# Patient Record
Sex: MALE | Race: WHITE | NOT HISPANIC OR LATINO | Employment: FULL TIME | URBAN - METROPOLITAN AREA
[De-identification: names, ages, dates, MRNs, and addresses within clinical notes are randomized per-mention and may not be internally consistent; named-entity substitution may affect disease eponyms.]

---

## 2020-01-15 ENCOUNTER — HOSPITAL ENCOUNTER (OUTPATIENT)
Facility: MEDICAL CENTER | Age: 60
End: 2020-01-15
Attending: PREVENTIVE MEDICINE
Payer: COMMERCIAL

## 2020-01-15 ENCOUNTER — EH NON-PROVIDER (OUTPATIENT)
Dept: OCCUPATIONAL MEDICINE | Facility: CLINIC | Age: 60
End: 2020-01-15
Payer: OTHER GOVERNMENT

## 2020-01-15 ENCOUNTER — APPOINTMENT (OUTPATIENT)
Dept: RADIOLOGY | Facility: IMAGING CENTER | Age: 60
End: 2020-01-15
Attending: PREVENTIVE MEDICINE
Payer: OTHER GOVERNMENT

## 2020-01-15 DIAGNOSIS — Z02.1 PRE-EMPLOYMENT DRUG SCREENING: ICD-10-CM

## 2020-01-15 DIAGNOSIS — Z02.89 ENCOUNTER FOR OCCUPATIONAL HEALTH EXAMINATION: Primary | ICD-10-CM

## 2020-01-15 DIAGNOSIS — Z02.89 ENCOUNTER FOR OCCUPATIONAL HEALTH EXAMINATION: ICD-10-CM

## 2020-01-15 LAB
ALBUMIN SERPL BCP-MCNC: 4.6 G/DL (ref 3.2–4.9)
ALBUMIN/GLOB SERPL: 1.6 G/DL
ALP SERPL-CCNC: 75 U/L (ref 30–99)
ALT SERPL-CCNC: 28 U/L (ref 2–50)
AMP AMPHETAMINE: NORMAL
ANION GAP SERPL CALC-SCNC: 9 MMOL/L (ref 0–11.9)
AST SERPL-CCNC: 20 U/L (ref 12–45)
BAR BARBITURATES: NORMAL
BASOPHILS # BLD AUTO: 0.3 % (ref 0–1.8)
BASOPHILS # BLD: 0.02 K/UL (ref 0–0.12)
BILIRUB CONJ SERPL-MCNC: 0.1 MG/DL (ref 0.1–0.5)
BILIRUB INDIRECT SERPL-MCNC: 0.5 MG/DL (ref 0–1)
BILIRUB SERPL-MCNC: 0.6 MG/DL (ref 0.1–1.5)
BUN SERPL-MCNC: 19 MG/DL (ref 8–22)
BZO BENZODIAZEPINES: NORMAL
CALCIUM SERPL-MCNC: 9.7 MG/DL (ref 8.5–10.5)
CHLORIDE SERPL-SCNC: 103 MMOL/L (ref 96–112)
CO2 SERPL-SCNC: 26 MMOL/L (ref 20–33)
COC COCAINE: NORMAL
CREAT SERPL-MCNC: 1 MG/DL (ref 0.5–1.4)
EOSINOPHIL # BLD AUTO: 0.12 K/UL (ref 0–0.51)
EOSINOPHIL NFR BLD: 2 % (ref 0–6.9)
ERYTHROCYTE [DISTWIDTH] IN BLOOD BY AUTOMATED COUNT: 44.2 FL (ref 35.9–50)
GLOBULIN SER CALC-MCNC: 2.9 G/DL (ref 1.9–3.5)
GLUCOSE SERPL-MCNC: 96 MG/DL (ref 65–99)
HCT VFR BLD AUTO: 44.6 % (ref 42–52)
HGB BLD-MCNC: 14.7 G/DL (ref 14–18)
IMM GRANULOCYTES # BLD AUTO: 0.02 K/UL (ref 0–0.11)
IMM GRANULOCYTES NFR BLD AUTO: 0.3 % (ref 0–0.9)
INT CON NEG: NORMAL
INT CON POS: NORMAL
LYMPHOCYTES # BLD AUTO: 1.32 K/UL (ref 1–4.8)
LYMPHOCYTES NFR BLD: 21.6 % (ref 22–41)
MCH RBC QN AUTO: 30.9 PG (ref 27–33)
MCHC RBC AUTO-ENTMCNC: 33 G/DL (ref 33.7–35.3)
MCV RBC AUTO: 93.9 FL (ref 81.4–97.8)
MDMA ECSTASY: NORMAL
MET METHAMPHETAMINES: NORMAL
MONOCYTES # BLD AUTO: 0.54 K/UL (ref 0–0.85)
MONOCYTES NFR BLD AUTO: 8.9 % (ref 0–13.4)
MTD METHADONE: NORMAL
NEUTROPHILS # BLD AUTO: 4.08 K/UL (ref 1.82–7.42)
NEUTROPHILS NFR BLD: 66.9 % (ref 44–72)
NRBC # BLD AUTO: 0 K/UL
NRBC BLD-RTO: 0 /100 WBC
OPI OPIATES: NORMAL
OXY OXYCODONE: NORMAL
PCP PHENCYCLIDINE: NORMAL
PLATELET # BLD AUTO: 229 K/UL (ref 164–446)
PMV BLD AUTO: 9.4 FL (ref 9–12.9)
POC URINE DRUG SCREEN OCDRS: NORMAL
POTASSIUM SERPL-SCNC: 3.8 MMOL/L (ref 3.6–5.5)
PROT SERPL-MCNC: 7.5 G/DL (ref 6–8.2)
RBC # BLD AUTO: 4.75 M/UL (ref 4.7–6.1)
SODIUM SERPL-SCNC: 138 MMOL/L (ref 135–145)
THC: NORMAL
WBC # BLD AUTO: 6.1 K/UL (ref 4.8–10.8)

## 2020-01-15 PROCEDURE — 86480 TB TEST CELL IMMUN MEASURE: CPT | Performed by: PREVENTIVE MEDICINE

## 2020-01-15 PROCEDURE — 92552 PURE TONE AUDIOMETRY AIR: CPT | Performed by: PREVENTIVE MEDICINE

## 2020-01-15 PROCEDURE — 36415 COLL VENOUS BLD VENIPUNCTURE: CPT | Performed by: PREVENTIVE MEDICINE

## 2020-01-15 PROCEDURE — 94375 RESPIRATORY FLOW VOLUME LOOP: CPT | Performed by: PREVENTIVE MEDICINE

## 2020-01-15 PROCEDURE — 94010 BREATHING CAPACITY TEST: CPT | Performed by: PREVENTIVE MEDICINE

## 2020-01-15 PROCEDURE — 86762 RUBELLA ANTIBODY: CPT | Performed by: PREVENTIVE MEDICINE

## 2020-01-15 PROCEDURE — 90715 TDAP VACCINE 7 YRS/> IM: CPT | Performed by: PREVENTIVE MEDICINE

## 2020-01-15 PROCEDURE — 86735 MUMPS ANTIBODY: CPT | Performed by: PREVENTIVE MEDICINE

## 2020-01-15 PROCEDURE — 71045 X-RAY EXAM CHEST 1 VIEW: CPT | Mod: TC | Performed by: NURSE PRACTITIONER

## 2020-01-15 PROCEDURE — 86765 RUBEOLA ANTIBODY: CPT | Performed by: PREVENTIVE MEDICINE

## 2020-01-15 PROCEDURE — 80305 DRUG TEST PRSMV DIR OPT OBS: CPT | Performed by: PREVENTIVE MEDICINE

## 2020-01-17 LAB
GAMMA INTERFERON BACKGROUND BLD IA-ACNC: 0.02 IU/ML
M TB IFN-G BLD-IMP: NEGATIVE
M TB IFN-G CD4+ BCKGRND COR BLD-ACNC: 0 IU/ML
MEV IGG SER IA-ACNC: 0.14
MITOGEN IGNF BCKGRD COR BLD-ACNC: >10 IU/ML
MUV IGG SER IA-ACNC: 0.75
QFT TB2 - NIL TBQ2: 0 IU/ML
RUBV AB SER QL: 47.7 IU/ML
VZV IGG SER IA-ACNC: 2.74

## 2020-01-22 ENCOUNTER — EMPLOYEE HEALTH (OUTPATIENT)
Dept: OCCUPATIONAL MEDICINE | Facility: CLINIC | Age: 60
End: 2020-01-22
Payer: OTHER GOVERNMENT

## 2020-01-22 DIAGNOSIS — Z23 NEED FOR VACCINATION: ICD-10-CM

## 2020-01-22 PROCEDURE — 8915 PR COMPREHENSIVE PHYSICAL: Performed by: PREVENTIVE MEDICINE

## 2020-01-22 PROCEDURE — 90707 MMR VACCINE SC: CPT | Performed by: PREVENTIVE MEDICINE

## 2020-01-22 SDOH — HEALTH STABILITY: MENTAL HEALTH: HOW OFTEN DO YOU HAVE A DRINK CONTAINING ALCOHOL?: MONTHLY OR LESS

## 2020-01-22 SDOH — HEALTH STABILITY: MENTAL HEALTH: HOW MANY STANDARD DRINKS CONTAINING ALCOHOL DO YOU HAVE ON A TYPICAL DAY?: 1 OR 2

## 2020-01-22 SDOH — HEALTH STABILITY: MENTAL HEALTH: HOW OFTEN DO YOU HAVE 6 OR MORE DRINKS ON ONE OCCASION?: NEVER

## 2020-02-22 ENCOUNTER — HOSPITAL ENCOUNTER (EMERGENCY)
Facility: MEDICAL CENTER | Age: 60
End: 2020-02-22
Attending: EMERGENCY MEDICINE
Payer: OTHER GOVERNMENT

## 2020-02-22 VITALS
TEMPERATURE: 97.5 F | HEIGHT: 73 IN | WEIGHT: 215 LBS | BODY MASS INDEX: 28.49 KG/M2 | SYSTOLIC BLOOD PRESSURE: 126 MMHG | RESPIRATION RATE: 28 BRPM | HEART RATE: 75 BPM | OXYGEN SATURATION: 95 % | DIASTOLIC BLOOD PRESSURE: 73 MMHG

## 2020-02-22 DIAGNOSIS — S01.81XA FACIAL LACERATION, INITIAL ENCOUNTER: ICD-10-CM

## 2020-02-22 DIAGNOSIS — R55 VASOVAGAL SYNCOPE: ICD-10-CM

## 2020-02-22 LAB
ANION GAP SERPL CALC-SCNC: 10 MMOL/L (ref 0–11.9)
BASOPHILS # BLD AUTO: 0.4 % (ref 0–1.8)
BASOPHILS # BLD: 0.03 K/UL (ref 0–0.12)
BUN SERPL-MCNC: 24 MG/DL (ref 8–22)
CALCIUM SERPL-MCNC: 8 MG/DL (ref 8.5–10.5)
CHLORIDE SERPL-SCNC: 108 MMOL/L (ref 96–112)
CO2 SERPL-SCNC: 21 MMOL/L (ref 20–33)
CREAT SERPL-MCNC: 1.13 MG/DL (ref 0.5–1.4)
EKG IMPRESSION: NORMAL
EKG IMPRESSION: NORMAL
EOSINOPHIL # BLD AUTO: 0.02 K/UL (ref 0–0.51)
EOSINOPHIL NFR BLD: 0.3 % (ref 0–6.9)
ERYTHROCYTE [DISTWIDTH] IN BLOOD BY AUTOMATED COUNT: 44.9 FL (ref 35.9–50)
GLUCOSE SERPL-MCNC: 141 MG/DL (ref 65–99)
HCT VFR BLD AUTO: 44.7 % (ref 42–52)
HGB BLD-MCNC: 14.2 G/DL (ref 14–18)
IMM GRANULOCYTES # BLD AUTO: 0.02 K/UL (ref 0–0.11)
IMM GRANULOCYTES NFR BLD AUTO: 0.3 % (ref 0–0.9)
LYMPHOCYTES # BLD AUTO: 0.52 K/UL (ref 1–4.8)
LYMPHOCYTES NFR BLD: 6.8 % (ref 22–41)
MCH RBC QN AUTO: 30.2 PG (ref 27–33)
MCHC RBC AUTO-ENTMCNC: 31.8 G/DL (ref 33.7–35.3)
MCV RBC AUTO: 95.1 FL (ref 81.4–97.8)
MONOCYTES # BLD AUTO: 0.5 K/UL (ref 0–0.85)
MONOCYTES NFR BLD AUTO: 6.5 % (ref 0–13.4)
NEUTROPHILS # BLD AUTO: 6.59 K/UL (ref 1.82–7.42)
NEUTROPHILS NFR BLD: 85.7 % (ref 44–72)
NRBC # BLD AUTO: 0 K/UL
NRBC BLD-RTO: 0 /100 WBC
PLATELET # BLD AUTO: 176 K/UL (ref 164–446)
PMV BLD AUTO: 9.3 FL (ref 9–12.9)
POTASSIUM SERPL-SCNC: 3.4 MMOL/L (ref 3.6–5.5)
RBC # BLD AUTO: 4.7 M/UL (ref 4.7–6.1)
SODIUM SERPL-SCNC: 139 MMOL/L (ref 135–145)
TROPONIN T SERPL-MCNC: 8 NG/L (ref 6–19)
WBC # BLD AUTO: 7.7 K/UL (ref 4.8–10.8)

## 2020-02-22 PROCEDURE — 93005 ELECTROCARDIOGRAM TRACING: CPT | Performed by: EMERGENCY MEDICINE

## 2020-02-22 PROCEDURE — 99284 EMERGENCY DEPT VISIT MOD MDM: CPT

## 2020-02-22 PROCEDURE — 84484 ASSAY OF TROPONIN QUANT: CPT

## 2020-02-22 PROCEDURE — 93005 ELECTROCARDIOGRAM TRACING: CPT

## 2020-02-22 PROCEDURE — 80048 BASIC METABOLIC PNL TOTAL CA: CPT

## 2020-02-22 PROCEDURE — 85025 COMPLETE CBC W/AUTO DIFF WBC: CPT

## 2020-02-22 ASSESSMENT — ENCOUNTER SYMPTOMS
PALPITATIONS: 0
LOSS OF CONSCIOUSNESS: 1
FEVER: 0
DIZZINESS: 1
SORE THROAT: 0
DIARRHEA: 0

## 2020-02-22 NOTE — ED TRIAGE NOTES
Brought in by medics from a [employer] testing center. Patient recently started working here and was taking a test for his job certification. Sitting in the chair he felt faint, had a syncopal episode and slid out of chair. Small laceration to left eyebrow. Denies any blood thinners. States he did not eat breakfast and normally does, felt as if he was getting a cold this morning. Patient endorses feeling well at this time. Has had vasovagal episodes in the past.

## 2020-02-22 NOTE — ED NOTES
Discharged to home with spouse. Verbalized understanding of all discharge instructions, education, and follow-up care. Denied concerns, needs upon discharge. Ambulated out of department independently without difficulty.

## 2020-02-22 NOTE — ED PROVIDER NOTES
ED Provider Note    Scribed for Ananda Raya M.D. by Geraldine Alves. 2/22/2020, 1:46 PM.    Primary care provider: Pcp Pt States None  Means of arrival: EMS  History obtained from: Patient  History limited by: None    CHIEF COMPLAINT  Chief Complaint   Patient presents with   • Syncope   • T-5000 GLF       ALDO Marcos is a 59 y.o. male who presents to the Emergency Department for syncopal episode onset prior to arrival. The patient was at work today sitting at a desk when he felt lightheaded and lost consciousness shortly after. He struck his forehead on the desk. The patient endorsed fatigue this morning and felt he may be getting sick. The patient denies chest pain, palpitations, cough, nasal congestion, sore throat, fever, or diarrhea. The patient has a history vasovagal syncope onset 8 years ago.       REVIEW OF SYSTEMS  Review of Systems   Constitutional: Positive for malaise/fatigue. Negative for fever.   HENT: Negative for congestion and sore throat.    Cardiovascular: Negative for chest pain and palpitations.   Gastrointestinal: Negative for diarrhea.   Neurological: Positive for dizziness and loss of consciousness.        Positive for syncope   All other systems reviewed and are negative.      PAST MEDICAL HISTORY       SURGICAL HISTORY  patient denies any surgical history    SOCIAL HISTORY  Social History     Tobacco Use   • Smoking status: Never Smoker   • Smokeless tobacco: Never Used   Substance Use Topics   • Alcohol use: Yes     Frequency: Monthly or less     Drinks per session: 1 or 2     Binge frequency: Never   • Drug use: Never      Social History     Substance and Sexual Activity   Drug Use Never       FAMILY HISTORY  History reviewed. No pertinent family history.    CURRENT MEDICATIONS  Home Medications     Reviewed by Yanique Jorge R.N. (Registered Nurse) on 02/22/20 at 1325  Med List Status: Partial   Medication Last Dose Status        Patient Laz Taking any Medications            "            ALLERGIES  No Known Allergies    PHYSICAL EXAM  VITAL SIGNS: Pulse 72   Temp 36.4 °C (97.5 °F) (Temporal)   Resp 18   Ht 1.854 m (6' 1\")   Wt 97.5 kg (215 lb)   SpO2 92%   BMI 28.37 kg/m²     Constitutional:  No acute distress  HENT:  Moist mucous membranes. 0.5 cm laceration of the left supraorbital rim   Eyes: No conjunctivitis or icterus  Neck:  trachea is midline, no palpable thyroid  Lymphatic:  No cervical lymphadenopathy  Cardiovascular:  Regular rate and rhythm, no murmurs  Thorax & Lungs:  Normal breath sounds, no rhonchi  Abdomen:  Soft, Non-tender  Skin:.  no rash  Back:  Non-tender, no CVA tenderness  Extremities:   no edema  Vascular:  symmetric radial pulse  Neurologic: Alert and oriented. Normal gross motor      LABS  Labs Reviewed   CBC WITH DIFFERENTIAL - Abnormal; Notable for the following components:       Result Value    MCHC 31.8 (*)     Neutrophils-Polys 85.70 (*)     Lymphocytes 6.80 (*)     Lymphs (Absolute) 0.52 (*)     All other components within normal limits   BASIC METABOLIC PANEL - Abnormal; Notable for the following components:    Potassium 3.4 (*)     Glucose 141 (*)     Bun 24 (*)     Calcium 8.0 (*)     All other components within normal limits   TROPONIN   ESTIMATED GFR     All labs reviewed by me.    EKG Interpretation    EKG Interpretation  Interpreted by me  Normal Sinus Rhythm. Rate 73  Normal QTc  Normal QRS  Normal Axis    COURSE & MEDICAL DECISION MAKING  Pertinent Labs & Imaging studies reviewed. (See chart for details)    1:46 PM - Patient seen and examined at bedside. Ordered Estimated GFR, CBC, BMP, Troponin, and EKG to evaluate his symptoms. The differential diagnoses include but are not limited to: Vasovagal syncope assess electrolyte, blood sugar, and cardiac function    4:48 PM - Re-examined; The patient is resting in bed. I discussed his above findings were overall unremarkable and plans for discharge. He was given a referral to [facility] and instructed to return to the ED if his symptoms worsen. Patient understands and agrees.       Medical Decision Making:   Patient presents with symptoms of a vasovagal episode.  He has had a before he did not feel well when he woke up today.  His lab work shows no significant abnormality nondetectable troponin normal EKG.  At this point I think further evaluation is needed he is discharged with return precautions for vasovagal episode and given referral follow-up     The patient will return for new or worsening symptoms and is stable at the time of discharge.    The patient is referred to a primary physician for blood pressure management, diabetic screening, and for all other preventative health concerns.      DISPOSITION:  Patient will be discharged home in stable condition.    FOLLOW UP:  [address] [address]  [phone]            FINAL IMPRESSION  1. Facial laceration, initial encounter    2. Vasovagal syncope          Geraldine MANCERA (Scribe), am scribing for, and in the presence of, Ananda Raya M.D..    Electronically signed by: Geraldine Alves (Scribe), 2/22/2020    IAnanda M.D. personally performed the services described in this documentation, as scribed by Geraldine Alves in my presence, and it is both accurate and complete.  C  The note accurately reflects work and decisions made by me.  Ananda Raya M.D.  2/22/2020  6:46 PM

## 2020-02-22 NOTE — ED NOTES
Resting in bed. Call bell within reach. Patient denies any symptoms. Repositioning self in bed. Spouse at bedside.

## 2020-02-27 ENCOUNTER — EH NON-PROVIDER (OUTPATIENT)
Dept: OCCUPATIONAL MEDICINE | Facility: CLINIC | Age: 60
End: 2020-02-27

## 2020-02-27 DIAGNOSIS — Z23 NEED FOR VACCINATION: Primary | ICD-10-CM

## 2020-02-27 PROCEDURE — 90707 MMR VACCINE SC: CPT | Performed by: NURSE PRACTITIONER

## 2020-09-22 ENCOUNTER — IMMUNIZATION (OUTPATIENT)
Dept: OCCUPATIONAL MEDICINE | Facility: CLINIC | Age: 60
End: 2020-09-22

## 2020-09-22 DIAGNOSIS — Z23 NEED FOR VACCINATION: ICD-10-CM

## 2020-09-22 PROCEDURE — 90686 IIV4 VACC NO PRSV 0.5 ML IM: CPT | Performed by: PREVENTIVE MEDICINE

## 2020-11-27 ENCOUNTER — HOSPITAL ENCOUNTER (OUTPATIENT)
Facility: MEDICAL CENTER | Age: 60
End: 2020-11-27
Attending: NURSE PRACTITIONER
Payer: COMMERCIAL

## 2020-11-27 ENCOUNTER — OFFICE VISIT (OUTPATIENT)
Dept: URGENT CARE | Facility: CLINIC | Age: 60
End: 2020-11-27
Payer: COMMERCIAL

## 2020-11-27 VITALS
SYSTOLIC BLOOD PRESSURE: 116 MMHG | OXYGEN SATURATION: 97 % | TEMPERATURE: 98.5 F | DIASTOLIC BLOOD PRESSURE: 72 MMHG | BODY MASS INDEX: 26.9 KG/M2 | HEIGHT: 73 IN | RESPIRATION RATE: 16 BRPM | HEART RATE: 82 BPM | WEIGHT: 203 LBS

## 2020-11-27 DIAGNOSIS — Z20.822 SUSPECTED COVID-19 VIRUS INFECTION: ICD-10-CM

## 2020-11-27 DIAGNOSIS — R05.9 COUGH: ICD-10-CM

## 2020-11-27 PROCEDURE — U0003 INFECTIOUS AGENT DETECTION BY NUCLEIC ACID (DNA OR RNA); SEVERE ACUTE RESPIRATORY SYNDROME CORONAVIRUS 2 (SARS-COV-2) (CORONAVIRUS DISEASE [COVID-19]), AMPLIFIED PROBE TECHNIQUE, MAKING USE OF HIGH THROUGHPUT TECHNOLOGIES AS DESCRIBED BY CMS-2020-01-R: HCPCS

## 2020-11-27 PROCEDURE — 99203 OFFICE O/P NEW LOW 30 MIN: CPT | Mod: CS | Performed by: NURSE PRACTITIONER

## 2020-11-27 RX ORDER — CODEINE PHOSPHATE AND GUAIFENESIN 10; 100 MG/5ML; MG/5ML
5 SOLUTION ORAL EVERY 4 HOURS PRN
Qty: 100 ML | Refills: 0 | Status: SHIPPED | OUTPATIENT
Start: 2020-11-27 | End: 2020-12-02

## 2020-11-27 RX ORDER — TAMSULOSIN HYDROCHLORIDE 0.4 MG/1
CAPSULE ORAL
COMMUNITY
Start: 2020-11-18

## 2020-11-27 RX ORDER — ALBUTEROL SULFATE 90 UG/1
2 AEROSOL, METERED RESPIRATORY (INHALATION) EVERY 4 HOURS PRN
Qty: 1 EACH | Refills: 0 | Status: SHIPPED | OUTPATIENT
Start: 2020-11-27 | End: 2020-12-11

## 2020-11-27 RX ORDER — OMEPRAZOLE 20 MG/1
20 CAPSULE, DELAYED RELEASE ORAL DAILY
COMMUNITY
End: 2022-07-03

## 2020-11-27 ASSESSMENT — ENCOUNTER SYMPTOMS
MYALGIAS: 1
SINUS PAIN: 0
CHILLS: 0
WHEEZING: 0
FEVER: 0
EYE DISCHARGE: 0
NAUSEA: 0
DIARRHEA: 0
HEADACHES: 1
ABDOMINAL PAIN: 0
RHINORRHEA: 1
SORE THROAT: 0
SHORTNESS OF BREATH: 0
VOMITING: 0
PALPITATIONS: 0
COUGH: 1
SPUTUM PRODUCTION: 0
EYE PAIN: 0
EYE REDNESS: 0
HEMOPTYSIS: 0

## 2020-11-27 ASSESSMENT — FIBROSIS 4 INDEX: FIB4 SCORE: 1.29

## 2020-11-27 NOTE — PROGRESS NOTES
Subjective:     Abbi Marcos is a 60 y.o. 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 The current episode started in the past 7 days. The problem has been unchanged. The cough is non-productive. Associated symptoms include headaches, myalgias, nasal congestion and rhinorrhea. Pertinent negatives include no chest pain, chills, ear pain, eye redness, fever, hemoptysis, rash, sore throat, shortness of breath or wheezing. He has tried nothing for the symptoms. There is no history of environmental allergies.   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      Review of Systems   Constitutional: Positive for malaise/fatigue. Negative for chills and fever.   HENT: Positive for congestion and rhinorrhea. Negative for ear pain, sinus pain and sore throat.    Eyes: Negative for pain, discharge and redness.   Respiratory: Positive for cough. Negative for hemoptysis, sputum production, shortness of breath and wheezing.    Cardiovascular: Negative for chest pain and palpitations.   Gastrointestinal: Negative for abdominal pain, diarrhea, nausea and vomiting.   Musculoskeletal: Positive for myalgias.   Skin: Negative for rash.   Neurological: Positive for headaches.   Endo/Heme/Allergies: Negative for environmental allergies.   All other systems reviewed and are negative.      PMH: No past medical history on file.  ALLERGIES: No Known Allergies  SURGHX: No past surgical history on file.  SOCHX:   Social History     Socioeconomic History   • Marital status:      Spouse name: Not on file   • Number of children: Not on file   • Years of education: Not on file   • Highest education level: Not on file   Occupational History   • Not on  "file   Social Needs   • Financial resource strain: Not on file   • Food insecurity     Worry: Not on file     Inability: Not on file   • Transportation needs     Medical: Not on file     Non-medical: Not on file   Tobacco Use   • Smoking status: Never Smoker   • Smokeless tobacco: Never Used   Substance and Sexual Activity   • Alcohol use: Yes     Frequency: Monthly or less     Drinks per session: 1 or 2     Binge frequency: Never   • Drug use: Never   • Sexual activity: Not on file   Lifestyle   • Physical activity     Days per week: Not on file     Minutes per session: Not on file   • Stress: Not on file   Relationships   • Social connections     Talks on phone: Not on file     Gets together: Not on file     Attends Ten[employer] service: Not on file     Active member of club or organization: Not on file     Attends meetings of clubs or organizations: Not on file     Relationship status: Not on file   • Intimate partner violence     Fear of current or ex partner: Not on file     Emotionally abused: Not on file     Physically abused: Not on file     Forced sexual activity: Not on file   Other Topics Concern   • Not on file   Social History Narrative   • Not on file     FH: No family history on file.      Objective:   /72   Pulse 82   Temp 36.9 °C (98.5 °F)   Resp 16   Ht 1.854 m (6' 1\")   Wt 92.1 kg (203 lb)   SpO2 97%   BMI 26.78 kg/m²     Physical Exam  Vitals signs and nursing note reviewed.   Constitutional:       General: He is not in acute distress.     Appearance: Normal appearance. He is ill-appearing. He is not toxic-appearing.   HENT:      Head: Normocephalic and atraumatic.      Right Ear: Tympanic membrane, ear canal and external ear normal. There is no impacted cerumen.      Left Ear: Tympanic membrane, ear canal and external ear normal. There is no impacted cerumen.      Nose: No congestion or rhinorrhea.      Mouth/Throat:      Mouth: Mucous membranes are moist.      Pharynx: No " oropharyngeal exudate or posterior oropharyngeal erythema.   Eyes:      General:         Right eye: No discharge.         Left eye: No discharge.      Extraocular Movements: Extraocular movements intact.      Pupils: Pupils are equal, round, and reactive to light.   Neck:      Musculoskeletal: Normal range of motion and neck supple. Muscular tenderness present.   Cardiovascular:      Rate and Rhythm: Normal rate and regular rhythm.      Pulses: Normal pulses.      Heart sounds: Normal heart sounds. No murmur.   Pulmonary:      Effort: Pulmonary effort is normal. No respiratory distress.      Breath sounds: Normal breath sounds. No wheezing or rales.      Comments: Fine crackles in bilateral lower lobes  Abdominal:      General: Abdomen is flat. Bowel sounds are normal.      Palpations: Abdomen is soft.      Tenderness: There is abdominal tenderness. There is no right CVA tenderness or left CVA tenderness.   Musculoskeletal: Normal range of motion.   Lymphadenopathy:      Cervical: Cervical adenopathy present.   Skin:     General: Skin is warm and dry.      Capillary Refill: Capillary refill takes less than 2 seconds.   Neurological:      General: No focal deficit present.      Mental Status: He is alert and oriented to person, place, and time. Mental status is at baseline.   Psychiatric:         Mood and Affect: Mood normal.         Behavior: Behavior normal.         Thought Content: Thought content normal.         Judgment: Judgment normal.         Assessment/Plan:   Assessment    1. Suspected COVID-19 virus infection  COVID/SARS COV-2 PCR   2. Cough  guaifenesin-codeine (ROBITUSSIN AC) Solution oral solution    albuterol 108 (90 Base) MCG/ACT Aero Soln inhalation aerosol   Pt was tested for COVID today. I will call with results. Upon entering the room PPE was worn throughout the duration of his visit for both provider and patient. Mask of patient briefly removed for examination of oropharynx. PT was educated to  remain in self isolation for at least 10 days following the onset of symptoms and to not return to work until symptoms have resolved for three days without the use of symptom altering medication.     We discussed supportive measures including humidifier, warm salt water gargles, over-the-counter Cepacol throat lozenges, rest  and increased fluids. Pt was encouraged to seek treatment back in the ER or urgent care for worsening symptoms,  fever greater than 100.5, wheezes or shortness of breath.   AVS handout given and reviewed with patient. Pt educated on red flags and when to seek treatment back in ER or UC.

## 2020-11-29 DIAGNOSIS — Z20.822 SUSPECTED COVID-19 VIRUS INFECTION: ICD-10-CM

## 2020-11-29 LAB
COVID ORDER STATUS COVID19: NORMAL
SARS-COV-2 RNA RESP QL NAA+PROBE: DETECTED
SPECIMEN SOURCE: ABNORMAL

## 2020-11-30 ENCOUNTER — TELEPHONE (OUTPATIENT)
Dept: URGENT CARE | Facility: CLINIC | Age: 60
End: 2020-11-30

## 2020-11-30 DIAGNOSIS — R05.9 COUGH: ICD-10-CM

## 2020-11-30 DIAGNOSIS — U07.1 COVID-19 VIRUS DETECTED: ICD-10-CM

## 2020-11-30 RX ORDER — DEXTROMETHORPHAN HYDROBROMIDE AND PROMETHAZINE HYDROCHLORIDE 15; 6.25 MG/5ML; MG/5ML
5 SYRUP ORAL EVERY 4 HOURS PRN
Qty: 100 ML | Refills: 0 | Status: SHIPPED | OUTPATIENT
Start: 2020-11-30 | End: 2020-12-07

## 2020-12-20 DIAGNOSIS — Z23 NEED FOR VACCINATION: ICD-10-CM

## 2020-12-21 ENCOUNTER — APPOINTMENT (OUTPATIENT)
Dept: FAMILY PLANNING/WOMEN'S HEALTH CLINIC | Facility: IMMUNIZATION CENTER | Age: 60
End: 2020-12-21
Attending: FAMILY MEDICINE
Payer: COMMERCIAL

## 2020-12-21 DIAGNOSIS — Z23 NEED FOR VACCINATION: ICD-10-CM

## 2020-12-21 PROCEDURE — 91300 PFIZER SARS-COV-2 VACCINE: CPT

## 2020-12-21 PROCEDURE — 0001A PFIZER SARS-COV-2 VACCINE: CPT

## 2020-12-22 ENCOUNTER — IMMUNIZATION (OUTPATIENT)
Dept: FAMILY PLANNING/WOMEN'S HEALTH CLINIC | Facility: IMMUNIZATION CENTER | Age: 60
End: 2020-12-22
Payer: COMMERCIAL

## 2020-12-22 DIAGNOSIS — Z23 ENCOUNTER FOR VACCINATION: Primary | ICD-10-CM

## 2021-01-11 ENCOUNTER — IMMUNIZATION (OUTPATIENT)
Dept: FAMILY PLANNING/WOMEN'S HEALTH CLINIC | Facility: IMMUNIZATION CENTER | Age: 61
End: 2021-01-11
Attending: FAMILY MEDICINE
Payer: COMMERCIAL

## 2021-01-11 DIAGNOSIS — Z23 ENCOUNTER FOR VACCINATION: Primary | ICD-10-CM

## 2021-01-11 PROCEDURE — 0002A PFIZER SARS-COV-2 VACCINE: CPT

## 2021-01-11 PROCEDURE — 91300 PFIZER SARS-COV-2 VACCINE: CPT

## 2021-03-22 ENCOUNTER — EH NON-PROVIDER (OUTPATIENT)
Dept: OCCUPATIONAL MEDICINE | Facility: CLINIC | Age: 61
End: 2021-03-22

## 2021-03-22 DIAGNOSIS — Z02.89 ENCOUNTER FOR OCCUPATIONAL HEALTH EXAMINATION INVOLVING RESPIRATOR: ICD-10-CM

## 2021-03-22 PROCEDURE — 94375 RESPIRATORY FLOW VOLUME LOOP: CPT | Performed by: NURSE PRACTITIONER

## 2022-07-03 ENCOUNTER — OFFICE VISIT (OUTPATIENT)
Dept: URGENT CARE | Facility: CLINIC | Age: 62
End: 2022-07-03
Payer: COMMERCIAL

## 2022-07-03 ENCOUNTER — HOSPITAL ENCOUNTER (OUTPATIENT)
Facility: MEDICAL CENTER | Age: 62
End: 2022-07-03
Attending: FAMILY MEDICINE
Payer: COMMERCIAL

## 2022-07-03 VITALS
BODY MASS INDEX: 27.57 KG/M2 | RESPIRATION RATE: 16 BRPM | HEIGHT: 73 IN | WEIGHT: 208 LBS | OXYGEN SATURATION: 97 % | HEART RATE: 96 BPM | SYSTOLIC BLOOD PRESSURE: 108 MMHG | TEMPERATURE: 100.3 F | DIASTOLIC BLOOD PRESSURE: 70 MMHG

## 2022-07-03 DIAGNOSIS — Z03.818 ENCOUNTER FOR PATIENT CONCERN ABOUT EXPOSURE TO INFECTIOUS ORGANISM: ICD-10-CM

## 2022-07-03 LAB
EXTERNAL QUALITY CONTROL: NORMAL
SARS-COV+SARS-COV-2 AG RESP QL IA.RAPID: NEGATIVE

## 2022-07-03 PROCEDURE — U0003 INFECTIOUS AGENT DETECTION BY NUCLEIC ACID (DNA OR RNA); SEVERE ACUTE RESPIRATORY SYNDROME CORONAVIRUS 2 (SARS-COV-2) (CORONAVIRUS DISEASE [COVID-19]), AMPLIFIED PROBE TECHNIQUE, MAKING USE OF HIGH THROUGHPUT TECHNOLOGIES AS DESCRIBED BY CMS-2020-01-R: HCPCS

## 2022-07-03 PROCEDURE — 87426 SARSCOV CORONAVIRUS AG IA: CPT | Performed by: FAMILY MEDICINE

## 2022-07-03 PROCEDURE — U0005 INFEC AGEN DETEC AMPLI PROBE: HCPCS

## 2022-07-03 PROCEDURE — 99213 OFFICE O/P EST LOW 20 MIN: CPT | Mod: CS | Performed by: FAMILY MEDICINE

## 2022-07-03 NOTE — PROGRESS NOTES
"  Subjective:      61 y.o. male presents to urgent care for cold symptoms that started yesterday. He is experiencing sore throat, cough, body aches, and fever. No headache or diarrhea. He has been using Tylenol and cough drops with moderate relief in symptoms. He denies any tobacco product use. No history of asthma or COPD. He is fully vaccinated against COVID. No known sick contacts.     He denies any other questions or concerns at this time.    Current problem list, medication, and past medical/surgical history were reviewed in Epic.    ROS  See HPI     Objective:      /70 (BP Location: Left arm, Patient Position: Sitting, BP Cuff Size: Adult)   Pulse 96   Temp 37.9 °C (100.3 °F) (Temporal)   Resp 16   Ht 1.854 m (6' 1\")   Wt 94.3 kg (208 lb)   SpO2 97%   BMI 27.44 kg/m²     Physical Exam  Constitutional:       General: He is not in acute distress.     Appearance: He is not diaphoretic.   HENT:      Right Ear: Tympanic membrane, ear canal and external ear normal.      Left Ear: Tympanic membrane, ear canal and external ear normal.      Mouth/Throat:      Pharynx: Uvula midline. No posterior oropharyngeal erythema.      Tonsils: No tonsillar exudate. 0 on the right. 0 on the left.   Cardiovascular:      Rate and Rhythm: Normal rate and regular rhythm.      Heart sounds: Normal heart sounds.   Pulmonary:      Effort: Pulmonary effort is normal. No respiratory distress.      Breath sounds: Normal breath sounds.   Neurological:      Mental Status: He is alert.   Psychiatric:         Mood and Affect: Affect normal.         Judgment: Judgment normal.       Assessment/Plan:     1. Encounter for patient concern about exposure to infectious organism  Rapid COVID-negative. Testing performed for COVID-19. In the meantime patient was advised to isolate until COVID test results returned. I encouraged mask use, frequent handwashing, wiping down hard surfaces, etc. Tylenol and Ibuprofen were recommended for " symptomatic relief. If positive they will be contacted by their local health department regarding return to work/school protocols. Patient is currently without indication of need for higher level of care. Patient/Guardian was given precautionary signs/symptoms that mandate immediate follow up and evaluation in the ED. The patient and/or guardian demonstrated a good understanding and agreed with the treatment plan.  - POCT SARS-COV Antigen ELIZABETH Manual Result  - SARS-CoV-2 PCR (24 hour In-House): Collect NP swab in VTM; Future      Instructed to return to Urgent Care or nearest Emergency Department if symptoms fail to improve, for any change in condition, further concerns, or new concerning symptoms. Patient states understanding of the plan of care and discharge instructions.    Nyasia Lewis M.D.

## 2022-09-27 ENCOUNTER — IMMUNIZATION (OUTPATIENT)
Dept: OCCUPATIONAL MEDICINE | Facility: CLINIC | Age: 62
End: 2022-09-27

## 2022-09-27 DIAGNOSIS — Z23 NEED FOR VACCINATION: Primary | ICD-10-CM

## 2022-09-29 PROCEDURE — 90686 IIV4 VACC NO PRSV 0.5 ML IM: CPT | Performed by: PREVENTIVE MEDICINE

## 2023-10-02 ENCOUNTER — IMMUNIZATION (OUTPATIENT)
Dept: OCCUPATIONAL MEDICINE | Facility: CLINIC | Age: 63
End: 2023-10-02

## 2023-10-02 DIAGNOSIS — Z23 NEED FOR VACCINATION: Primary | ICD-10-CM

## 2023-10-02 PROCEDURE — 90686 IIV4 VACC NO PRSV 0.5 ML IM: CPT | Performed by: PREVENTIVE MEDICINE

## 2024-09-24 ENCOUNTER — IMMUNIZATION (OUTPATIENT)
Dept: OCCUPATIONAL MEDICINE | Facility: CLINIC | Age: 64
End: 2024-09-24

## 2024-09-24 DIAGNOSIS — Z23 NEED FOR VACCINATION: Primary | ICD-10-CM

## 2024-11-30 ENCOUNTER — OFFICE VISIT (OUTPATIENT)
Dept: URGENT CARE | Facility: CLINIC | Age: 64
End: 2024-11-30
Payer: COMMERCIAL

## 2024-11-30 VITALS
HEIGHT: 72 IN | OXYGEN SATURATION: 100 % | DIASTOLIC BLOOD PRESSURE: 88 MMHG | SYSTOLIC BLOOD PRESSURE: 136 MMHG | HEART RATE: 60 BPM | TEMPERATURE: 97.4 F | RESPIRATION RATE: 14 BRPM | BODY MASS INDEX: 29.08 KG/M2 | WEIGHT: 214.7 LBS

## 2024-11-30 DIAGNOSIS — R60.0 LEG EDEMA, RIGHT: ICD-10-CM

## 2024-11-30 DIAGNOSIS — R03.0 ELEVATED BP WITHOUT DIAGNOSIS OF HYPERTENSION: ICD-10-CM

## 2024-11-30 RX ORDER — FINASTERIDE 5 MG/1
TABLET, FILM COATED ORAL
COMMUNITY
Start: 2024-11-23

## 2024-11-30 RX ORDER — SILDENAFIL 100 MG/1
100 TABLET, FILM COATED ORAL
COMMUNITY
Start: 2024-10-15

## 2024-11-30 NOTE — PROGRESS NOTES
Verbal consent was acquired by the patient to use [facility] ambient listening note generation during this visit     Date: 11/30/24        Chief Complaint   Patient presents with    Other     High bp, 140/90  x today           History of Present Illness  The patient is a 64-year-old male who presents to urgent care for evaluation of high blood pressure and unilateral RLE swelling. He is accompanied by his wife.    Three weeks ago, he experienced swelling in his right foot, which he initially attributed to his job that involves extensive walking. The swelling persisted for a couple of weeks before subsiding, although a slight swelling remains. His primary care physician, Dr. Nasrin Rodriguez, was informed about his swollen foot and recommended ED evaluation, which he politely declined.  Denies any trauma to the leg or foot, history of blood clot, recent surgery, prolonged period of immobility, recent travel.  Denies calf pain, erythema.    He reports that a coworker told him that elevated blood pressure may cause leg swelling, so he went to [facility] to use the [employer] blood pressure machine this afternoon. The blood pressure recorded as 140/90.  He was concerned that this may be too elevated, which prompted him to seek treatment in the urgent care setting.  He reports no headaches, visual changes, or chest pain. He also reports no swelling in his lower legs, cough, or shortness of breath. There is no numbness or tingling in his legs, and he does not wear compression socks at work. He mentions a feeling of tightness in his calves upon waking up, which resolves after stretching. He has no history of recent surgeries or blood clots.  Wife is concerned that he may have circulation issues.    FAMILY HISTORY  He has a family history of high blood pressure.     ROS:    No severe shortness of breath   No Cardiac like chest pain, as discussed   As otherwise stated in HPI    Medical/SX/ Social History:  Reviewed per  chart    Pertinent Medications:    Current Outpatient Medications on File Prior to Visit   Medication Sig Dispense Refill    finasteride (PROSCAR) 5 MG Tab TAKE 1 TABLET BY MOUTH EVERY OTHER DAY      sildenafil citrate (VIAGRA) 100 MG tablet Take 100 mg by mouth 1 time a day as needed for Erectile Dysfunction.      tamsulosin (FLOMAX) 0.4 MG capsule        No current facility-administered medications on file prior to visit.        Allergies:    Patient has no known allergies.     Problem list, medications, and allergies reviewed by myself today in Epic     Physical Exam:    Vitals:    24 1548   BP: 136/88   Pulse: 60   Resp: 14   Temp: 36.3 °C (97.4 °F)   SpO2: 100%             Physical Exam  Vitals reviewed.   Constitutional:       General: He is not in acute distress.     Appearance: Normal appearance. He is normal weight. He is not ill-appearing or toxic-appearing.   HENT:      Head: Normocephalic and atraumatic.      Right Ear: Tympanic membrane, ear canal and external ear normal.      Left Ear: Tympanic membrane, ear canal and external ear normal.      Nose: Nose normal.      Mouth/Throat:      Mouth: Mucous membranes are moist.   Eyes:      Extraocular Movements: Extraocular movements intact.      Pupils: Pupils are equal, round, and reactive to light.   Cardiovascular:      Rate and Rhythm: Normal rate and regular rhythm.      Pulses: Normal pulses. No decreased pulses.      Heart sounds: Normal heart sounds.      No systolic murmur is present.      No diastolic murmur is present.   Pulmonary:      Effort: Pulmonary effort is normal.      Breath sounds: Normal breath sounds.   Abdominal:      General: Abdomen is flat.      Palpations: Abdomen is soft.   Musculoskeletal:         General: Normal range of motion.      Cervical back: Normal range of motion and neck supple.      Right lower le+ Edema present.      Left lower le+ Edema present.   Skin:     General: Skin is warm and dry.    Neurological:      General: No focal deficit present.      Mental Status: He is alert and oriented to person, place, and time. Mental status is at baseline.   Psychiatric:         Mood and Affect: Mood normal.         Behavior: Behavior normal.         Thought Content: Thought content normal.        Diagnostics:      Medical Decision making and plan :  I personally reviewed prior external notes and test results pertinent to today's visit. Pt is clinically stable at today's acute urgent care visit.  Patient appears nontoxic with no acute distress noted. Appropriate for outpatient care at this time.    Pleasant 64 y.o. male presented clinic with:     Assessment & Plan    1. Elevated BP without diagnosis of hypertension    2. Leg edema, right  - Referral to Vascular Medicine     1. Hypertension.  His blood pressure reading in clinic again mildly elevated.  Indicating no immediate concerns for hypertensive emergency.  The patient is well-appearing and in no acute distress.  He is completely asymptomatic.. He was advised to maintain a blood pressure log and monitor his weight. Recommendations included adhering to a low sodium diet, ensuring adequate hydration, and following a Mediterranean diet. He was also advised to monitor for any symptoms such as headaches, visual changes, chest pain, or shortness of breath. If symptoms develop, he should seek immediate medical attention. He was instructed to follow up with his primary care physician, Dr. Rodriguez, for further management and potential antihypertensive medication if needed.    2. Right foot swelling.  The swelling in his right foot has significantly decreased but remains slightly swollen. There are no signs of a blood clot, such as calf pain, redness, or warmth.  Possible venous insufficiency.  He was advised to wear compression socks during work hours, elevate his legs during breaks, and elevate his legs while sleeping to promote circulation and drainage. He should also  monitor for any new symptoms and report them to his primary care physician.  A stat referral to vascular medicine was placed for evaluation of this symptom.      Shared decision-making was utilized with patient for treatment plan. Medication discussed included indication for use and the potential benefits and side effects. Education was provided regarding the aforementioned assessments.  Differential Diagnosis, natural history, and supportive care discussed. All of the patient's questions were answered to their satisfaction at the time of discharge. Patient was encouraged to monitor symptoms closely. Those signs and symptoms which would warrant concern and mandate seeking a higher level of service through the emergency department discussed at length.  Patient stated agreement and understanding of this plan of care.    Disposition:  Home in stable condition     Voice Recognition Disclaimer:  Portions of this document were created using voice recognition software and Ditto technology provided by [facility]. The software does have a chance of producing errors of grammar and possibly content. I have made every reasonable attempt to correct obvious errors, but there may be errors of grammar and possibly content that I did not discover before finalizing the  documentation.    FRANCISCO J Douglas.

## 2024-12-03 ENCOUNTER — TELEPHONE (OUTPATIENT)
Dept: CARDIOLOGY | Facility: MEDICAL CENTER | Age: 64
End: 2024-12-03
Payer: COMMERCIAL

## 2024-12-03 ENCOUNTER — TELEPHONE (OUTPATIENT)
Dept: VASCULAR LAB | Facility: MEDICAL CENTER | Age: 64
End: 2024-12-03
Payer: COMMERCIAL

## 2024-12-03 NOTE — TELEPHONE ENCOUNTER
Left message for patient to call back to get scheduled this week per the below message.         Douglas Orourke, Medical Assistant   [facility]   Ph: [phone]  Fx: [phone]

## 2024-12-03 NOTE — TELEPHONE ENCOUNTER
[facility] MED    Caller: Shabana Marcos (wifey)    Topic/issue: MEDICAL ADVICE    Shabana is calling in to see if there are any other options for appointments other the one that we have scheduled for Friday 12/6. Shabana would like a call back to discuss their options. Please advise.    Thank you,  Donell ARCE    Callback Number: [phone] (home)

## 2024-12-03 NOTE — TELEPHONE ENCOUNTER
----- Message from Physician Michael Bloch, M.D. sent at 12/2/2024  6:37 PM PST -----  Regarding: urgent initial  Needs to be seen this week - EMILY or preferably colleen - 20 min ok

## 2024-12-05 ENCOUNTER — OFFICE VISIT (OUTPATIENT)
Dept: VASCULAR LAB | Facility: MEDICAL CENTER | Age: 64
End: 2024-12-05
Attending: FAMILY MEDICINE
Payer: COMMERCIAL

## 2024-12-05 VITALS
HEART RATE: 66 BPM | DIASTOLIC BLOOD PRESSURE: 79 MMHG | HEIGHT: 72 IN | BODY MASS INDEX: 28.99 KG/M2 | WEIGHT: 214 LBS | SYSTOLIC BLOOD PRESSURE: 120 MMHG

## 2024-12-05 DIAGNOSIS — R06.09 DOE (DYSPNEA ON EXERTION): ICD-10-CM

## 2024-12-05 DIAGNOSIS — E78.5 DYSLIPIDEMIA: Chronic | ICD-10-CM

## 2024-12-05 DIAGNOSIS — E66.3 OVERWEIGHT (BMI 25.0-29.9): Chronic | ICD-10-CM

## 2024-12-05 DIAGNOSIS — R60.0 BILATERAL LOWER EXTREMITY EDEMA: ICD-10-CM

## 2024-12-05 DIAGNOSIS — R55 VASOVAGAL SYNCOPE: Chronic | ICD-10-CM

## 2024-12-05 DIAGNOSIS — I87.2 CHRONIC VENOUS INSUFFICIENCY: Chronic | ICD-10-CM

## 2024-12-05 PROCEDURE — 99204 OFFICE O/P NEW MOD 45 MIN: CPT | Performed by: FAMILY MEDICINE

## 2024-12-05 PROCEDURE — 99212 OFFICE O/P EST SF 10 MIN: CPT

## 2024-12-05 PROCEDURE — 3078F DIAST BP <80 MM HG: CPT | Performed by: FAMILY MEDICINE

## 2024-12-05 PROCEDURE — 3074F SYST BP LT 130 MM HG: CPT | Performed by: FAMILY MEDICINE

## 2024-12-05 ASSESSMENT — ENCOUNTER SYMPTOMS
SPUTUM PRODUCTION: 0
CHILLS: 0
PALPITATIONS: 0
PND: 0
FEVER: 0
WHEEZING: 0
CLAUDICATION: 0
HEMOPTYSIS: 0
ORTHOPNEA: 0
SHORTNESS OF BREATH: 0
COUGH: 0

## 2024-12-05 NOTE — PROGRESS NOTES
INITIAL VASCULAR MEDICINE CLINIC VISIT  12/05/24     Abbi Marcos is a 64 y.o. male referred eval/mgmt of LE edema, est 12/2024   Referring provider: Estefania Munoz    Subjective      EDEMA:  Initial visit hx: seen at  11/30/24 for high /90 due to consider for cause of LE edema.  Reported RLE > LLE swelling w/o pain.  Noted swelling for a few weeks prior to visit.  Had been advised for eval in the ED for r/o DVT but opted to not pursue.  Advised to maintain elevation, compression socks.  Here for further eval.  Currently denies overt pain.   Reports used arch support in show.    Today shows reduced bilat foot and calf swelling but not resolved.    No prior hx of HF, VTE  Initial hx of symptoms/pattern:   Edema-causing conditions:   Increase hydrostatic pressure: CVI   Decreased oncotic pressure/hypoalbuminuria: None  Increased interstitial oncotic pressure (Hypothyroidism): none  Decreased lymphatic drainage (primary vs secondary): none   Increased capillary permeability: None   Meds possibly contributing to edema:  None   Prior work-up: none   Tx attempted to date:    Compression? Knee-high   Diuretics? no   Other? Elevation at night      HTN: no prior dx or meds, prior 140/s90, currently stable  HLD: no prior dx or meds    Dysglycemia: no  Antithrombotic tx: NONE  - no hx of bleeding      Patient Active Problem List    Diagnosis Date Noted    Vasovagal syncope 12/05/2024    Bilateral lower extremity edema 12/05/2024    Overweight (BMI 25.0-29.9) 12/05/2024    Dyslipidemia 12/05/2024    Chronic venous insufficiency 12/05/2024      History reviewed. No pertinent surgical history.   History reviewed. No pertinent family history.   Current Outpatient Medications on File Prior to Visit   Medication Sig Dispense Refill    finasteride (PROSCAR) 5 MG Tab TAKE 1 TABLET BY MOUTH EVERY OTHER DAY      tamsulosin (FLOMAX) 0.4 MG capsule       sildenafil citrate (VIAGRA) 100 MG tablet Take 100 mg by  mouth 1 time a day as needed for Erectile Dysfunction.       No current facility-administered medications on file prior to visit.      No Known Allergies     Social History     Tobacco Use    Smoking status: Never    Smokeless tobacco: Never   Vaping Use    Vaping status: Never Used   Substance Use Topics    Alcohol use: Yes    Drug use: Never     DIET AND EXERCISE:  Weight Change: stable   Diet: common adult  Exercise: moderate regular exercise program     Review of Systems   Constitutional:  Negative for chills, fever and malaise/fatigue.   Respiratory:  Negative for cough, hemoptysis, sputum production, shortness of breath and wheezing.    Cardiovascular:  Positive for leg swelling. Negative for chest pain, palpitations, orthopnea, claudication and PND.          Objective    Vitals:    12/05/24 1546   BP: 120/79   BP Location: Left arm   Patient Position: Sitting   BP Cuff Size: Large adult   Pulse: 66   Weight: 97.1 kg (214 lb)   Height: 1.829 m (6')      BP Readings from Last 4 Encounters:   12/05/24 120/79   11/30/24 136/88   07/03/22 108/70   11/27/20 116/72      Body mass index is 29.02 kg/m².   Wt Readings from Last 4 Encounters:   12/05/24 97.1 kg (214 lb)   11/30/24 97.4 kg (214 lb 11.2 oz)   07/03/22 94.3 kg (208 lb)   11/27/20 92.1 kg (203 lb)      Physical Exam  Constitutional:       General: He is not in acute distress.     Appearance: Normal appearance. He is not diaphoretic.   HENT:      Head: Normocephalic and atraumatic.   Eyes:      Conjunctiva/sclera: Conjunctivae normal.   Cardiovascular:      Rate and Rhythm: Normal rate and regular rhythm.      Pulses:           Carotid pulses are 2+ on the right side and 2+ on the left side.       Radial pulses are 2+ on the right side and 2+ on the left side.        Dorsalis pedis pulses are 2+ on the right side and 2+ on the left side.        Posterior tibial pulses are 2+ on the right side and 2+ on the left side.      Heart sounds: Normal heart sounds.  "     Comments: Stemmer's negative bilat   No abd wall varicosities     Spider telangectasia:  Scattered BLE  Varicosities:           RLE: none      LLE: prominent GSV at medial malleolus   Corona phlebectatica:      RLE:  mild       LLE:  None   Cording:         RLE:  None     LLE: None     No stasis dermatitis or pigmentation  No lipodermatosclerosis or atrophe daisha  No healed or current VLUs    Pulmonary:      Effort: Pulmonary effort is normal.      Breath sounds: Normal breath sounds.   Musculoskeletal:      Right lower leg: No edema.      Left lower leg: No edema.   Skin:     General: Skin is warm and dry.   Neurological:      Mental Status: He is alert and oriented to person, place, and time.      Cranial Nerves: No cranial nerve deficit.      Gait: Gait is intact.   Psychiatric:         Mood and Affect: Mood and affect normal.          DATA REVIEW    No results found for: \"CHOLSTRLTOT\", \"LDL\", \"HDL\", \"TRIGLYCERIDE\"    No results found for: \"LDL\"    No results found for: \"LIPOPROTA\"   No results found for: \"APOB\"   No results found for: \"CRPHIGHSEN\"     Lab Results   Component Value Date/Time    SODIUM 139 02/22/2020 01:26 PM    POTASSIUM 3.4 (L) 02/22/2020 01:26 PM    CHLORIDE 108 02/22/2020 01:26 PM    CO2 21 02/22/2020 01:26 PM    GLUCOSE 141 (H) 02/22/2020 01:26 PM    BUN 24 (H) 02/22/2020 01:26 PM    CREATININE 1.13 02/22/2020 01:26 PM     Lab Results   Component Value Date/Time    ALKPHOSPHAT 75 01/15/2020 02:00 PM    ASTSGOT 20 01/15/2020 02:00 PM    ALTSGPT 28 01/15/2020 02:00 PM    TBILIRUBIN 0.6 01/15/2020 02:00 PM       No results found for: \"HBA1C\"    No results found for: \"MICROALBCALC\", \"MALBCRT\", \"MALBEXCR\", \"[id number]\", \"MICROALBUR\", \"MICRALB\", \"UMICROALBUM\", \"MICROALBTIM\"      IMAGING:      PROCEDURES:  none          Medical Decision Making:  Today's Assessment / Status / Plan:     1. Chronic venous insufficiency  US-EXTREMITY VENOUS LOWER BILAT    Diosmin-Hesperidin Methyl Chal 900-100 MG " "Tab      2. Bilateral lower extremity edema  TSH WITH REFLEX TO FT4    Comp Metabolic Panel    Lipid Profile    CBC WITHOUT DIFFERENTIAL    CORTISOL    proBrain Natriuretic Peptide, NT    D-DIMER    URIC ACID    US-EXTREMITY VENOUS LOWER BILAT      3. Vasovagal syncope        4. Overweight (BMI 25.0-29.9)        5. Dyslipidemia  Lipid Profile    Lipoprotein (a)      6. HOBSON (dyspnea on exertion)  proBrain Natriuretic Peptide, NT         Established CVD:  none     EDEMA: appears to be chronic venous insuff  and dependent edema related   - no hx of HF, renal, hepatic or endocrine dz   LLE CEAP classification: C2A / Ep / As / Pr  RLE CEAP classification: C3S / Ep / As / Pr  Venous clinical severity score:  R = 6 / L = 3  - reviewed anatomy, pathophys and gave educ handouts regarding CVI, common s/s, possible complications, and tx options   Plan:  - update labs including D-dimer - if positive with need stat duplex   - check venous reflux duplex to eval for possible options for interventions - as reviewed, only amenable to interventions if superficial VR found in isolation to deep VR   - start/continue knee-high compression socks, 20-30mmHg, as much as tolerated, reviewed application, use of donning aide, resources for purchasing   - increase walking, avoid prolonged standing/sitting  - active calf pumping exercises if prolonged standing and elevate legs above heart level while sitting and sleeping   - emphasized need for reduction of central adiposity to reduce extrinsic compression of the low-pressure IVC  to improve venous return and reduce distal venous pressure in legs   - reviewed dietary changes and monitor weight and waist circumference  - side sleeping with body pillow may improve lymphatic and venous return by reducing  extrinsic compression on IVC during sleep  - reduce sodium (\"salt\") in diet to less than 2,000mg daily   - continue daily moisturizing lotion (such as Gold Bond Diabetic Foot Cream)  Medications: "    - in general, diuretics will not help with CVI-assoc edema and should be avoided   - preferred evidence-based venoactive agents include micronized MPFF (Rx Vasculera or OTC diosmin/hespiridin), Ruscus extract ('s broom extract)  - other available agents include horse chestnut seed extract   - start OTC diosmin/hespiridin     BLOOD PRESSURE MANAGEMENT  Office BP Goal ACC/AHA goal <130/80  Home BP at goal:  yes  Office BP at goal:  yes  24h ABPM:  not indicated  RDN candidate? N/a  Contributing factors: n/a   Plan:   - monitor annual office-based BP and/or intermittent at home BP, report >130/80    - continue healthy lifestyle  Medications: none      LIPID MANAGEMENT  Qualifies for Statin Therapy Based on current ACC/AHA Guidelines: N\A,   10-yr ASCVD risk score: The ASCVD Risk score (Mago MIJARES, et al., 2019) failed to calculate., N/A  Major ASCVD events: None    High-risk conditions: N/A  Risk-enhancers: N/A  Currently on Statin: No  Tx goals: LDL-C <100   At goal? No labs   Plan:   - update labs      GLYCEMIC MANAGEMENT Normal    ANTITHROMBOTIC THERAPY  not indicated     LIFESTYLE INTERVENTIONS  TOBACCO:    reports that he has never smoked. He has never used smokeless tobacco.   - continued complete avoidance of all tobacco products   PHYSICAL ACTIVITY: >150min/week of mod-intensity activity or as much as tolerated  NUTRITION: DASH  and reduce Na to 1500mg/day   ETOH: limit to 2 or less standard drinks/day   WT MGMT: focus on 5-7% reduction as initial goal  (1kg loss reduces SBP by 1 mmHg)    OTHER:     # hx vasovagal syncope - stable, no recent episodes  - avoid triggers, maintain good hydration     STUDIES: BLE VR duplex   LABS: as noted above  FOLLOW-UP: 3 months     Ihsan Polk M.D.   [facility]  [facility]  [phone]

## 2025-01-03 ENCOUNTER — PATIENT MESSAGE (OUTPATIENT)
Dept: HEALTH INFORMATION MANAGEMENT | Facility: OTHER | Age: 65
End: 2025-01-03

## 2025-01-05 LAB
ALBUMIN SERPL-MCNC: 4 G/DL (ref 3.9–4.9)
ALP SERPL-CCNC: 91 IU/L (ref 44–121)
ALT SERPL-CCNC: 22 IU/L (ref 0–44)
AST SERPL-CCNC: 16 IU/L (ref 0–40)
BILIRUB SERPL-MCNC: 0.5 MG/DL (ref 0–1.2)
BUN SERPL-MCNC: 17 MG/DL (ref 8–27)
BUN/CREAT SERPL: 17 (ref 10–24)
CALCIUM SERPL-MCNC: 8.8 MG/DL (ref 8.6–10.2)
CHLORIDE SERPL-SCNC: 104 MMOL/L (ref 96–106)
CHOLEST SERPL-MCNC: 197 MG/DL (ref 100–199)
CO2 SERPL-SCNC: 25 MMOL/L (ref 20–29)
CORTIS SERPL-MCNC: 14 UG/DL (ref 6.2–19.4)
CREAT SERPL-MCNC: 0.98 MG/DL (ref 0.76–1.27)
D DIMER PPP FEU-MCNC: 0.29 MG/L FEU (ref 0–0.49)
EGFRCR SERPLBLD CKD-EPI 2021: 86 ML/MIN/1.73
ERYTHROCYTE [DISTWIDTH] IN BLOOD BY AUTOMATED COUNT: 12.7 % (ref 11.6–15.4)
GLOBULIN SER CALC-MCNC: 2.4 G/DL (ref 1.5–4.5)
GLUCOSE SERPL-MCNC: 95 MG/DL (ref 70–99)
HCT VFR BLD AUTO: 44.8 % (ref 37.5–51)
HDLC SERPL-MCNC: 39 MG/DL
HGB BLD-MCNC: 15.1 G/DL (ref 13–17.7)
LDL CALC COMMENT:: ABNORMAL
LDLC SERPL CALC-MCNC: 125 MG/DL (ref 0–99)
LPA SERPL-SCNC: 30.5 NMOL/L
MCH RBC QN AUTO: 31.1 PG (ref 26.6–33)
MCHC RBC AUTO-ENTMCNC: 33.7 G/DL (ref 31.5–35.7)
MCV RBC AUTO: 92 FL (ref 79–97)
NRBC BLD AUTO-RTO: NORMAL %
NT-PROBNP SERPL-MCNC: 65 PG/ML (ref 0–210)
PLATELET # BLD AUTO: 220 X10E3/UL (ref 150–450)
POTASSIUM SERPL-SCNC: 4.1 MMOL/L (ref 3.5–5.2)
PROT SERPL-MCNC: 6.4 G/DL (ref 6–8.5)
RBC # BLD AUTO: 4.85 X10E6/UL (ref 4.14–5.8)
SODIUM SERPL-SCNC: 140 MMOL/L (ref 134–144)
T4 FREE SERPL-MCNC: 1.04 NG/DL (ref 0.82–1.77)
TRIGL SERPL-MCNC: 186 MG/DL (ref 0–149)
TSH SERPL DL<=0.005 MIU/L-ACNC: 4.77 UIU/ML (ref 0.45–4.5)
URATE SERPL-MCNC: 5.2 MG/DL (ref 3.8–8.4)
VLDLC SERPL CALC-MCNC: 33 MG/DL (ref 5–40)
WBC # BLD AUTO: 5.4 X10E3/UL (ref 3.4–10.8)

## 2025-01-06 PROBLEM — E78.2 MIXED HYPERLIPIDEMIA: Status: ACTIVE | Noted: 2025-01-06

## 2025-01-06 PROBLEM — R94.6 ABNORMAL THYROID FUNCTION TEST: Status: ACTIVE | Noted: 2025-01-06

## 2025-01-13 ENCOUNTER — HOSPITAL ENCOUNTER (OUTPATIENT)
Dept: RADIOLOGY | Facility: MEDICAL CENTER | Age: 65
End: 2025-01-13
Attending: FAMILY MEDICINE
Payer: COMMERCIAL

## 2025-01-13 DIAGNOSIS — R60.0 BILATERAL LOWER EXTREMITY EDEMA: ICD-10-CM

## 2025-01-13 DIAGNOSIS — I87.2 CHRONIC VENOUS INSUFFICIENCY: Chronic | ICD-10-CM

## 2025-01-13 PROCEDURE — 93970 EXTREMITY STUDY: CPT

## 2025-03-03 ENCOUNTER — OFFICE VISIT (OUTPATIENT)
Dept: VASCULAR LAB | Facility: MEDICAL CENTER | Age: 65
End: 2025-03-03
Attending: FAMILY MEDICINE
Payer: COMMERCIAL

## 2025-03-03 VITALS
WEIGHT: 214 LBS | HEART RATE: 57 BPM | BODY MASS INDEX: 28.99 KG/M2 | HEIGHT: 72 IN | SYSTOLIC BLOOD PRESSURE: 114 MMHG | DIASTOLIC BLOOD PRESSURE: 74 MMHG

## 2025-03-03 DIAGNOSIS — I87.2 CHRONIC VENOUS INSUFFICIENCY: ICD-10-CM

## 2025-03-03 DIAGNOSIS — E66.3 OVERWEIGHT (BMI 25.0-29.9): ICD-10-CM

## 2025-03-03 DIAGNOSIS — R60.0 BILATERAL LOWER EXTREMITY EDEMA: ICD-10-CM

## 2025-03-03 DIAGNOSIS — E78.5 DYSLIPIDEMIA: ICD-10-CM

## 2025-03-03 DIAGNOSIS — R94.6 ABNORMAL THYROID FUNCTION TEST: ICD-10-CM

## 2025-03-03 PROCEDURE — 3074F SYST BP LT 130 MM HG: CPT | Performed by: FAMILY MEDICINE

## 2025-03-03 PROCEDURE — 99212 OFFICE O/P EST SF 10 MIN: CPT

## 2025-03-03 PROCEDURE — 3078F DIAST BP <80 MM HG: CPT | Performed by: FAMILY MEDICINE

## 2025-03-03 PROCEDURE — 99214 OFFICE O/P EST MOD 30 MIN: CPT | Performed by: FAMILY MEDICINE

## 2025-03-03 ASSESSMENT — ENCOUNTER SYMPTOMS
ORTHOPNEA: 0
SHORTNESS OF BREATH: 0
FEVER: 0
WHEEZING: 0
HEMOPTYSIS: 0
SPUTUM PRODUCTION: 0
PND: 0
CHILLS: 0
COUGH: 0
CLAUDICATION: 0
PALPITATIONS: 0

## 2025-03-03 ASSESSMENT — FIBROSIS 4 INDEX: FIB4 SCORE: 0.99

## 2025-03-03 NOTE — PROGRESS NOTES
FOLLOW-UP VASCULAR MEDICINE CLINIC   03/03/25      Abbi Marcos, ref for eval/mgmt of LE edema, est 12/2024   Referring provider: Estefania Munoz    Subjective    Interval hx/concerns: last seen 1/2024, had interval normal BLE VR duplex along with normal endocrine eval, BNP, D-dimer.    Taking OTC MPFF, compression socks, increased water, less salt with resolution of edema.    ,    Mild high TSH 4.77, FT4 wnl - no hx of thyroid dz  Cortisol, BNP and d-dimer wnl .  Uric 5.2   No other new sx     EDEMA, presumed CVI:  Initial visit hx: seen at  11/30/24 for high /90 due to consider for cause of LE edema.  Reported RLE > LLE swelling w/o pain.  Noted swelling for a few weeks prior to visit.  Had been advised for eval in the ED for r/o DVT but opted to not pursue.  Advised to maintain elevation, compression socks.  Here for further eval.  Currently denies overt pain.   Reports used arch support in show.    Today shows reduced bilat foot and calf swelling but not resolved.    No prior hx of HF, VTE  Initial hx of symptoms/pattern:   Edema-causing conditions:   Increase hydrostatic pressure: CVI   Decreased oncotic pressure/hypoalbuminuria: None  Increased interstitial oncotic pressure (Hypothyroidism): none  Decreased lymphatic drainage (primary vs secondary): none   Increased capillary permeability: None   Meds possibly contributing to edema:  None   Prior work-up: none   Tx attempted to date:    Compression? Knee-high, 20-30mmHg most days    Meds?  Doctor's best vein support (MPFF)   Other? Elevation at night      HTN: no prior dx or meds, home BP:  120s/70s, no current meds   HLD: Baseline LDL = 125, no prior meds, no prior ascvd events   Dysglycemia: no    Antithrombotic tx: NONE  - no hx of bleeding       Current Outpatient Medications on File Prior to Visit   Medication Sig Dispense Refill    finasteride (PROSCAR) 5 MG Tab TAKE 1 TABLET BY MOUTH EVERY OTHER DAY      tamsulosin  (FLOMAX) 0.4 MG capsule       Diosmin-Hesperidin Methyl Chal 900-100 MG Tab Take 1 Tablet by mouth every day.      sildenafil citrate (VIAGRA) 100 MG tablet Take 100 mg by mouth 1 time a day as needed for Erectile Dysfunction.       No current facility-administered medications on file prior to visit.      No Known Allergies     Social History     Tobacco Use    Smoking status: Never    Smokeless tobacco: Never   Vaping Use    Vaping status: Never Used   Substance Use Topics    Alcohol use: Yes    Drug use: Never     DIET AND EXERCISE:  Weight Change: stable   Diet: common adult  Exercise: moderate regular exercise program     Review of Systems   Constitutional:  Negative for chills, fever and malaise/fatigue.   Respiratory:  Negative for cough, hemoptysis, sputum production, shortness of breath and wheezing.    Cardiovascular:  Negative for chest pain, palpitations, orthopnea, claudication, leg swelling and PND.          Objective    Vitals:    03/03/25 1533   BP: 114/74   BP Location: Left arm   Patient Position: Sitting   BP Cuff Size: Large adult   Pulse: (!) 57   Weight: 97.1 kg (214 lb)   Height: 1.829 m (6')      BP Readings from Last 4 Encounters:   03/03/25 114/74   12/05/24 120/79   11/30/24 136/88   07/03/22 108/70      Body mass index is 29.02 kg/m².   Wt Readings from Last 4 Encounters:   03/03/25 97.1 kg (214 lb)   12/05/24 97.1 kg (214 lb)   11/30/24 97.4 kg (214 lb 11.2 oz)   07/03/22 94.3 kg (208 lb)      Physical Exam  Constitutional:       General: He is not in acute distress.     Appearance: Normal appearance. He is not diaphoretic.   HENT:      Head: Normocephalic and atraumatic.   Eyes:      Conjunctiva/sclera: Conjunctivae normal.   Cardiovascular:      Rate and Rhythm: Normal rate and regular rhythm.      Pulses:           Carotid pulses are 2+ on the right side and 2+ on the left side.       Radial pulses are 2+ on the right side and 2+ on the left side.        Dorsalis pedis pulses are 2+ on  "the right side and 2+ on the left side.        Posterior tibial pulses are 2+ on the right side and 2+ on the left side.      Heart sounds: Normal heart sounds.      Comments: Stemmer's negative bilat   No abd wall varicosities     Spider telangectasia:  Scattered BLE  Varicosities:           RLE: none      LLE: prominent GSV at medial malleolus   Corona phlebectatica:      RLE:  mild       LLE:  None   Cording:         RLE:  None     LLE: None     No stasis dermatitis or pigmentation  No lipodermatosclerosis or atrophe daisha  No healed or current VLUs    Pulmonary:      Effort: Pulmonary effort is normal.      Breath sounds: Normal breath sounds.   Musculoskeletal:      Right lower leg: No edema.      Left lower leg: No edema.   Skin:     General: Skin is warm and dry.   Neurological:      Mental Status: He is alert and oriented to person, place, and time.      Cranial Nerves: No cranial nerve deficit.      Gait: Gait is intact.   Psychiatric:         Mood and Affect: Mood and affect normal.          DATA REVIEW    Lab Results   Component Value Date/Time    CHOLSTRLTOT 197 01/03/2025 05:26 AM    HDL 39 (L) 01/03/2025 05:26 AM    TRIGLYCERIDE 186 (H) 01/03/2025 05:26 AM       No results found for: \"LDL\"    Lab Results   Component Value Date/Time    LIPOPROTA 30.5 01/03/2025 05:26 AM      No results found for: \"APOB\"   No results found for: \"CRPHIGHSEN\"     Lab Results   Component Value Date/Time    SODIUM 140 01/03/2025 05:26 AM    SODIUM 139 02/22/2020 01:26 PM    POTASSIUM 4.1 01/03/2025 05:26 AM    POTASSIUM 3.4 (L) 02/22/2020 01:26 PM    CHLORIDE 104 01/03/2025 05:26 AM    CHLORIDE 108 02/22/2020 01:26 PM    CO2 25 01/03/2025 05:26 AM    CO2 21 02/22/2020 01:26 PM    GLUCOSE 95 01/03/2025 05:26 AM    GLUCOSE 141 (H) 02/22/2020 01:26 PM    BUN 17 01/03/2025 05:26 AM    BUN 24 (H) 02/22/2020 01:26 PM    CREATININE 0.98 01/03/2025 05:26 AM    CREATININE 1.13 02/22/2020 01:26 PM    BUNCREATRAT 17 01/03/2025 05:26 " "AM     Lab Results   Component Value Date/Time    ALKPHOSPHAT 91 2025 05:26 AM    ALKPHOSPHAT 75 01/15/2020 02:00 PM    ASTSGOT 16 2025 05:26 AM    ASTSGOT 20 01/15/2020 02:00 PM    ALTSGPT 22 2025 05:26 AM    ALTSGPT 28 01/15/2020 02:00 PM    TBILIRUBIN 0.5 2025 05:26 AM    TBILIRUBIN 0.6 01/15/2020 02:00 PM       No results found for: \"HBA1C\"    No results found for: \"MICROALBCALC\", \"MALBCRT\", \"MALBEXCR\", \"[id number]\", \"MICROALBUR\", \"MICRALB\", \"UMICROALBUM\", \"MICROALBTIM\"      IMAGIN/13/25 BLE VR duplex   No acute thrombosis is identified.     No significant reflux of the superficial or deep veins.      PROCEDURES:  none          Medical Decision Making:  Today's Assessment / Status / Plan:     1. Chronic venous insufficiency        2. Abnormal thyroid function test  TSH    FREE THYROXINE    T3 FREE    THYROID PEROXIDASE  (TPO) AB      3. Dyslipidemia  APOLIPOPROTEIN B    Comp Metabolic Panel    Lipid Profile      4. Bilateral lower extremity edema        5. Overweight (BMI 25.0-29.9)           Established CVD:  none     EDEMA: appears to be chronic venous insuff  and dependent edema related   - no hx of HF, renal, hepatic or endocrine dz   LLE CEAP classification: C2A / Ep / As / Pn  RLE CEAP classification: C3S / Ep / As / Pn  Venous clinical severity score:  R = 6 / L = 3  - reviewed anatomy, pathophys and gave educ handouts regarding CVI, common s/s, possible complications, and tx options   - D-dimer, BNP normal   - no other indications of sup or deep VR in either leg   Plan:  - start/continue knee-high compression socks, 20-30mmHg, as much as tolerated, reviewed application, use of donning aide, resources for purchasing   - increase walking, avoid prolonged standing/sitting  - active calf pumping exercises if prolonged standing and elevate legs above heart level while sitting and sleeping   - emphasized need for reduction of central adiposity to reduce extrinsic compression of the " "low-pressure IVC  to improve venous return and reduce distal venous pressure in legs   - reviewed dietary changes and monitor weight and waist circumference  - side sleeping with body pillow may improve lymphatic and venous return by reducing  extrinsic compression on IVC during sleep  - reduce sodium (\"salt\") in diet to less than 2,000mg daily   - continue daily moisturizing lotion (such as Gold Bond Diabetic Foot Cream)  Medications:    - in general, diuretics will not help with CVI-assoc edema and should be avoided   - preferred evidence-based venoactive agents include micronized MPFF (Rx Vasculera or OTC diosmin/hespiridin), Ruscus extract ('s broom extract)  - other available agents include horse chestnut seed extract   -  OTC diosmin/hespiridin     BLOOD PRESSURE MANAGEMENT  Office BP Goal per ACC/AHA  <130/80  Home BP at goal:  yes  Office BP at goal:  yes  Plan:   - monitor annual office-based BP and/or intermittent at home BP, report >130/80    - continue healthy lifestyle  Medications: none      LIPID MANAGEMENT  Qualifies for Statin Therapy per ACC/AHA Guidelines: yes    The 10-year ASCVD risk score (Mago MIJARES, et al., 2019) is: 11.2%, 7.5 - <20% \"intermediate risk\"   Major ASCVD events: None    High-risk conditions: N/A  Risk-enhancers: Persistently elevated trigs >174  Currently on Statin: No  Tx goals: LDL-C <100   At goal? No labs   Plan:   - primary focus on lifestyle mgmt   - Reviewed risks/benefits/alternatives for lipid-lowering therapies, and through shared decision-making we have opted to NOT initiate statin therapy to lower ASCVD risk and monitor labs in about 6mo       GLYCEMIC MANAGEMENT Normal    ANTITHROMBOTIC THERAPY  not indicated     LIFESTYLE INTERVENTIONS  TOBACCO:    reports that he has never smoked. He has never used smokeless tobacco.   - continued complete avoidance of all tobacco products   PHYSICAL ACTIVITY: >150min/week of mod-intensity activity or as much as " tolerated  NUTRITION: DASH  and reduce Na to 1500mg/day   ETOH: limit to 2 or less standard drinks/day   WT MGMT: focus on 5-7% reduction as initial goal  (1kg loss reduces SBP by 1 mmHg)    OTHER:     # hx vasovagal syncope - resolved, no recent episodes  - avoid triggers, maintain good hydration     # abnormal TSH indicating subclinical hypothyroidism with normal FT4 and TSH high but <10  Plan: update labs, suggest tx if new associated sx and/or TSH >10    STUDIES:  none   LABS: none  FOLLOW-UP: 6 months       Ihsan Polk M.D.   [facility]  [facility]  [phone]

## 2025-08-25 ENCOUNTER — TELEPHONE (OUTPATIENT)
Dept: VASCULAR LAB | Facility: MEDICAL CENTER | Age: 65
End: 2025-08-25
Payer: COMMERCIAL

## 2025-08-27 ENCOUNTER — HOSPITAL ENCOUNTER (OUTPATIENT)
Dept: LAB | Facility: MEDICAL CENTER | Age: 65
End: 2025-08-27
Attending: FAMILY MEDICINE
Payer: COMMERCIAL

## 2025-08-27 DIAGNOSIS — E78.5 DYSLIPIDEMIA: Chronic | ICD-10-CM

## 2025-08-27 DIAGNOSIS — R60.0 BILATERAL LOWER EXTREMITY EDEMA: ICD-10-CM

## 2025-08-27 DIAGNOSIS — R94.6 ABNORMAL THYROID FUNCTION TEST: ICD-10-CM

## 2025-08-27 DIAGNOSIS — R06.09 DOE (DYSPNEA ON EXERTION): ICD-10-CM

## 2025-08-27 LAB
ALBUMIN SERPL BCP-MCNC: 4.2 G/DL (ref 3.2–4.9)
ALBUMIN/GLOB SERPL: 1.5 G/DL
ALP SERPL-CCNC: 84 U/L (ref 30–99)
ALT SERPL-CCNC: 21 U/L (ref 2–50)
ANION GAP SERPL CALC-SCNC: 11 MMOL/L (ref 7–16)
AST SERPL-CCNC: 22 U/L (ref 12–45)
BILIRUB SERPL-MCNC: 0.6 MG/DL (ref 0.1–1.5)
BUN SERPL-MCNC: 16 MG/DL (ref 8–22)
CALCIUM ALBUM COR SERPL-MCNC: 8.9 MG/DL (ref 8.5–10.5)
CALCIUM SERPL-MCNC: 9.1 MG/DL (ref 8.5–10.5)
CHLORIDE SERPL-SCNC: 104 MMOL/L (ref 96–112)
CHOLEST SERPL-MCNC: 188 MG/DL (ref 100–199)
CO2 SERPL-SCNC: 24 MMOL/L (ref 20–33)
CREAT SERPL-MCNC: 1.01 MG/DL (ref 0.5–1.4)
D DIMER PPP IA.FEU-MCNC: <0.27 UG/ML (FEU) (ref 0–0.5)
FASTING STATUS PATIENT QL REPORTED: NORMAL
GFR SERPLBLD CREATININE-BSD FMLA CKD-EPI: 82 ML/MIN/1.73 M 2
GLOBULIN SER CALC-MCNC: 2.8 G/DL (ref 1.9–3.5)
GLUCOSE SERPL-MCNC: 91 MG/DL (ref 65–99)
HDLC SERPL-MCNC: 45 MG/DL
LDLC SERPL CALC-MCNC: 115 MG/DL
NT-PROBNP SERPL IA-MCNC: 63 PG/ML (ref 0–125)
POTASSIUM SERPL-SCNC: 4.2 MMOL/L (ref 3.6–5.5)
PROT SERPL-MCNC: 7 G/DL (ref 6–8.2)
SODIUM SERPL-SCNC: 139 MMOL/L (ref 135–145)
T3FREE SERPL-MCNC: 3.35 PG/ML (ref 2–4.4)
T4 FREE SERPL-MCNC: 1.04 NG/DL (ref 0.93–1.7)
THYROPEROXIDASE AB SERPL-ACNC: 11.9 IU/ML (ref 0–9)
TRIGL SERPL-MCNC: 139 MG/DL (ref 0–149)
TSH SERPL DL<=0.005 MIU/L-ACNC: 4.62 UIU/ML (ref 0.38–5.33)
URATE SERPL-MCNC: 6 MG/DL (ref 2.5–8.3)

## 2025-08-27 PROCEDURE — 84443 ASSAY THYROID STIM HORMONE: CPT

## 2025-08-27 PROCEDURE — 84550 ASSAY OF BLOOD/URIC ACID: CPT

## 2025-08-27 PROCEDURE — 86376 MICROSOMAL ANTIBODY EACH: CPT

## 2025-08-27 PROCEDURE — 84481 FREE ASSAY (FT-3): CPT

## 2025-08-27 PROCEDURE — 83695 ASSAY OF LIPOPROTEIN(A): CPT

## 2025-08-27 PROCEDURE — 82172 ASSAY OF APOLIPOPROTEIN: CPT

## 2025-08-27 PROCEDURE — 80053 COMPREHEN METABOLIC PANEL: CPT

## 2025-08-27 PROCEDURE — 84439 ASSAY OF FREE THYROXINE: CPT

## 2025-08-27 PROCEDURE — 36415 COLL VENOUS BLD VENIPUNCTURE: CPT

## 2025-08-27 PROCEDURE — 80061 LIPID PANEL: CPT

## 2025-08-27 PROCEDURE — 83880 ASSAY OF NATRIURETIC PEPTIDE: CPT

## 2025-08-27 PROCEDURE — 85379 FIBRIN DEGRADATION QUANT: CPT

## 2025-08-29 LAB
APO B100 SERPL-MCNC: 92 MG/DL (ref 66–133)
LPA SERPL-MCNC: 25 MG/DL